# Patient Record
Sex: MALE | Race: WHITE | NOT HISPANIC OR LATINO | Employment: FULL TIME | URBAN - METROPOLITAN AREA
[De-identification: names, ages, dates, MRNs, and addresses within clinical notes are randomized per-mention and may not be internally consistent; named-entity substitution may affect disease eponyms.]

---

## 2022-12-14 ENCOUNTER — HOSPITAL ENCOUNTER (EMERGENCY)
Facility: CLINIC | Age: 27
Discharge: HOME | End: 2022-12-15
Attending: EMERGENCY MEDICINE | Admitting: EMERGENCY MEDICINE
Payer: COMMERCIAL

## 2022-12-14 DIAGNOSIS — R06.02 SOB (SHORTNESS OF BREATH): ICD-10-CM

## 2022-12-14 LAB
D DIMER PPP FEU-MCNC: 0.41 UG/ML FEU (ref 0–0.5)
ERYTHROCYTE [DISTWIDTH] IN BLOOD BY AUTOMATED COUNT: 12.4 % (ref 10–15)
FLUAV RNA SPEC QL NAA+PROBE: NEGATIVE
FLUBV RNA RESP QL NAA+PROBE: NEGATIVE
HCT VFR BLD AUTO: 45.8 % (ref 40–53)
HGB BLD-MCNC: 15.2 G/DL (ref 13.3–17.7)
HOLD SPECIMEN: NORMAL
MCH RBC QN AUTO: 28.1 PG (ref 26.5–33)
MCHC RBC AUTO-ENTMCNC: 33.2 G/DL (ref 31.5–36.5)
MCV RBC AUTO: 85 FL (ref 78–100)
PLATELET # BLD AUTO: 346 10E3/UL (ref 150–450)
RBC # BLD AUTO: 5.41 10E6/UL (ref 4.4–5.9)
RSV RNA SPEC NAA+PROBE: NEGATIVE
SARS-COV-2 RNA RESP QL NAA+PROBE: NEGATIVE
TSH SERPL DL<=0.005 MIU/L-ACNC: 2.26 MU/L (ref 0.4–4)
WBC # BLD AUTO: 11.1 10E3/UL (ref 4–11)

## 2022-12-14 PROCEDURE — 258N000003 HC RX IP 258 OP 636: Performed by: EMERGENCY MEDICINE

## 2022-12-14 PROCEDURE — 85379 FIBRIN DEGRADATION QUANT: CPT | Performed by: EMERGENCY MEDICINE

## 2022-12-14 PROCEDURE — 87637 SARSCOV2&INF A&B&RSV AMP PRB: CPT | Performed by: EMERGENCY MEDICINE

## 2022-12-14 PROCEDURE — C9803 HOPD COVID-19 SPEC COLLECT: HCPCS

## 2022-12-14 PROCEDURE — 36415 COLL VENOUS BLD VENIPUNCTURE: CPT | Performed by: EMERGENCY MEDICINE

## 2022-12-14 PROCEDURE — 99283 EMERGENCY DEPT VISIT LOW MDM: CPT | Mod: CS,25

## 2022-12-14 PROCEDURE — 84443 ASSAY THYROID STIM HORMONE: CPT | Performed by: EMERGENCY MEDICINE

## 2022-12-14 PROCEDURE — 85027 COMPLETE CBC AUTOMATED: CPT | Performed by: EMERGENCY MEDICINE

## 2022-12-14 PROCEDURE — 96360 HYDRATION IV INFUSION INIT: CPT

## 2022-12-14 RX ORDER — ACETAMINOPHEN 325 MG/1
650 TABLET ORAL EVERY 6 HOURS PRN
COMMUNITY

## 2022-12-14 RX ORDER — LEVOCETIRIZINE DIHYDROCHLORIDE 5 MG/1
5 TABLET, FILM COATED ORAL DAILY PRN
COMMUNITY
Start: 2022-05-27

## 2022-12-14 RX ORDER — LISINOPRIL 20 MG/1
20 TABLET ORAL DAILY
COMMUNITY

## 2022-12-14 RX ORDER — ALBUTEROL SULFATE 90 UG/1
2 AEROSOL, METERED RESPIRATORY (INHALATION) EVERY 4 HOURS PRN
COMMUNITY

## 2022-12-14 RX ADMIN — SODIUM CHLORIDE 1000 ML: 9 INJECTION, SOLUTION INTRAVENOUS at 23:03

## 2022-12-14 ASSESSMENT — ENCOUNTER SYMPTOMS
FEVER: 0
RHINORRHEA: 0
COUGH: 1
WHEEZING: 1
SHORTNESS OF BREATH: 1

## 2022-12-14 ASSESSMENT — ACTIVITIES OF DAILY LIVING (ADL): ADLS_ACUITY_SCORE: 35

## 2022-12-14 NOTE — ED TRIAGE NOTES
2 weeks of shortness of breath. Ok at rest, worse with exertion. Talking in full sentences. Worse at night. Awakens patient with wheezing and gasping for breath. Patient reports feeling feverish at night - patient reports awakening feeling cold, but then awakens covered in blankets, hot and diaphoretic. Notes some cough fits - mostly dry cough. Sometimes a small amount of mucous. Slightly yellow sputum.     Patient is awake, alert and Oriented to person, place, time and situation.    Airway is patent.    Respirations are regular and unlabored at rest.  Patient talking in full sentences.  Patient endorses some intermittent dry cough over past couple weeks. None at present.  Patient reports feeling short of breath with activity.     Pulses are strong and regular with palpation. Patient is slightly tachycardic in low 100s.   Skin is normal color, warm and dry.   Cap refill is less than 3 seconds.  Patient denies chest pain/pressure.

## 2022-12-15 VITALS
RESPIRATION RATE: 20 BRPM | BODY MASS INDEX: 25.77 KG/M2 | WEIGHT: 180 LBS | OXYGEN SATURATION: 95 % | DIASTOLIC BLOOD PRESSURE: 94 MMHG | HEIGHT: 70 IN | HEART RATE: 84 BPM | SYSTOLIC BLOOD PRESSURE: 154 MMHG | TEMPERATURE: 98.3 F

## 2022-12-15 ASSESSMENT — ENCOUNTER SYMPTOMS
NAUSEA: 0
ABDOMINAL PAIN: 0
VOMITING: 0

## 2022-12-15 NOTE — ED PROVIDER NOTES
History   Chief Complaint:  Shortness of breath      The history is provided by the patient.      Marvin Shelley is a 27 year old male with history of hypertension who presents with 2 weeks of shortness of breath. The patient describes feeling fine at rest, but then becoming short of breath and wheezy with any activity, especially walking up the stairs or sitting up too quickly. He states that for the first 5 days of the symptoms he felt feverish, but every time he actually measured his temperature it was normal. He states that lately he has woken up in the middle of the night feeling feverish, chilled, and wheezy but when he checks his temperature the next day it is normal. He further reports intermittent coughing fits throughout the day, but denies any rhinorrhea or congestion. He reports a mildly reduced appetite but has still been eating and drinking. He states that he was initially seen for this 10 days ago in urgent care where he was prescribed prednisone and albuterol. He reports some short term relief after using the albuterol but no effect from the prednisone. He states that he was also seen in urgent care today where he had a normal chest X-ray, EKG, and blood work but was still sent here to rule out PE. He reports a history of hypertension for which he takes lisinopril, and is consistent about taking. He denies any history of surgery, tobacco use, any recent travel or having any allergies. He reports alcohol use on the weekends but denies tobacco or drug use.     Chest Xray 12/14/22 1627  IMPRESSION:  No evidence of active pulmonary disease.    Review of Systems   Constitutional: Negative for fever.   HENT: Negative for congestion and rhinorrhea.    Respiratory: Positive for cough, shortness of breath and wheezing.    Cardiovascular: Negative for chest pain.   Gastrointestinal: Negative for abdominal pain, nausea and vomiting.   All other systems reviewed and are negative.        Allergies:  The patient  "has no known allergies.     Medications:  Lisinopril    Past Medical History:     Hypertension  Adjustment disorder with mixed anxiety and depressed mood  Cystic acne    Past Surgical History:    [place] teeth extraction    Family History:    Father: occular melanoma  Mother: hypertension  Brother: heart disease    Social History:  The patient presents to the ED unaccompanied  Alcohol Use: on weekends  Tobacco Use: negative  Drug Use: negative  PCP: [facility]    Physical Exam     Patient Vitals for the past 24 hrs:   BP Temp Temp src Pulse Resp SpO2 Height Weight   12/15/22 0030 -- -- -- 84 20 95 % -- --   12/14/22 2351 -- -- -- 92 27 96 % -- --   12/14/22 2329 -- -- -- 77 27 98 % -- --   12/14/22 2248 -- -- -- 104 20 96 % -- --   12/14/22 2247 -- -- -- 112 16 97 % -- --   12/14/22 2246 -- -- -- 90 30 96 % -- --   12/14/22 2245 -- -- -- 106 26 95 % -- --   12/14/22 2237 (!) 154/94 -- -- 103 26 94 % -- --   12/14/22 1732 (!) 150/89 98.3  F (36.8  C) Temporal 102 20 97 % 1.778 m (5' 10\") 81.6 kg (180 lb)       Physical Exam  General: Appears well-developed and well-nourished.   Head: No signs of trauma.   CV: Normal rate and regular rhythm.    Resp: Effort normal and breath sounds normal. No respiratory distress.   GI: Soft. There is no tenderness.  No rebound or guarding.  Normal bowel sounds.    MSK: Normal range of motion. no edema. No Calf tenderness.  Neuro: The patient is alert and oriented. Speech normal.  Skin: Skin is warm and dry. No rash noted.   Psych: normal mood and affect. behavior is normal.       Emergency Department Course     Laboratory:  Labs Ordered and Resulted from Time of ED Arrival to Time of ED Departure   CBC WITH PLATELETS - Abnormal       Result Value    WBC Count 11.1 (*)     RBC Count 5.41      Hemoglobin 15.2      Hematocrit 45.8      MCV 85      MCH 28.1      MCHC 33.2      RDW 12.4      Platelet Count 346     D DIMER QUANTITATIVE - Normal    D-Dimer " Quantitative 0.41     TSH WITH FREE T4 REFLEX - Normal    TSH 2.26     INFLUENZA A/B & SARS-COV2 PCR MULTIPLEX - Normal    Influenza A PCR Negative      Influenza B PCR Negative      RSV PCR Negative      SARS CoV2 PCR Negative        Emergency Department Course:           Reviewed:  I reviewed nursing notes, vitals, past medical history and Care Everywhere    Assessments:  2246 I obtained history and examined the patient as noted above.   0033 I rechecked the patient and explained findings.     Interventions:  2303 NS 1L IV    Disposition:  The patient was discharged to home.     Impression & Plan     Medical Decision Making:  Marvin Shelley is a 27-year-old gentleman who presents with shortness of breath.  He reports over the last couple of weeks he has felt somewhat short of breath with exertion.  He states that he has had some periods of feeling somewhat fevered, although whenever he checks his temperature it is normal.  He had been to the urgent care couple of weeks ago and again today and was referred to the ER. On my evaluation his exam was reassuring.  A D-dimer was obtained that was negative along with a CBC.  He had a BMP done in the clinic.  I was able to review his chest x-ray from the clinic does not show signs of acute process.  I did repeat COVID, influenza, RSV swab which was negative.  Certainly possible the patient's symptoms may be related to a viral illness as he had had some cough associated with the symptoms as well.  Given the overall reassuring work-up, I felt is appropriate for discharge and I did recommend he follow-up in clinic with his primary care doctor to discuss any further evaluation if the symptoms persist.  I also recommended he return to the ER for any worsening symptoms or further concerns.    Covid-19  Marvin Shelley was evaluated during a global COVID-19 pandemic, which necessitated consideration that the patient might be at risk for infection with the SARS-CoV-2 virus that  causes COVID-19.   Applicable protocols for evaluation were followed during the patient's care.   COVID-19 was considered as part of the patient's evaluation. The plan for testing is:  a test was obtained during this visit.    Diagnosis:    ICD-10-CM    1. SOB (shortness of breath)  R06.02         Scribe Disclosure:  I, Trista Mccauley, am serving as a scribe at 10:45 PM on 12/14/2022 to document services personally performed by Robert Hernadez MD based on my observations and the provider's statements to me.            Robert Hernadez MD  12/15/22 0503